# Patient Record
Sex: MALE | Race: ASIAN | Employment: UNEMPLOYED | URBAN - METROPOLITAN AREA
[De-identification: names, ages, dates, MRNs, and addresses within clinical notes are randomized per-mention and may not be internally consistent; named-entity substitution may affect disease eponyms.]

---

## 2019-03-22 ENCOUNTER — HOSPITAL ENCOUNTER (EMERGENCY)
Age: 54
Discharge: HOME | End: 2019-03-22
Attending: EMERGENCY MEDICINE
Payer: COMMERCIAL

## 2019-03-22 VITALS
OXYGEN SATURATION: 95 % | RESPIRATION RATE: 18 BRPM | HEART RATE: 67 BPM | SYSTOLIC BLOOD PRESSURE: 145 MMHG | DIASTOLIC BLOOD PRESSURE: 82 MMHG | BODY MASS INDEX: 30.54 KG/M2 | WEIGHT: 238 LBS | HEIGHT: 74 IN | TEMPERATURE: 98.1 F

## 2019-03-22 DIAGNOSIS — G70.01 MYASTHENIA EXACERBATION (HCC): ICD-10-CM

## 2019-03-22 DIAGNOSIS — T50.905A MEDICATION REACTION, INITIAL ENCOUNTER: Primary | ICD-10-CM

## 2019-03-22 PROCEDURE — 99283 EMERGENCY DEPT VISIT LOW MDM: CPT | Performed by: EMERGENCY MEDICINE

## 2019-03-22 RX ORDER — AZATHIOPRINE 50 MG/1
200 TABLET ORAL DAILY
COMMUNITY

## 2019-03-22 RX ORDER — TADALAFIL 2.5 MG/1
2.5 TABLET ORAL
COMMUNITY

## 2019-03-22 RX ORDER — PYRIDOSTIGMINE BROMIDE 180 MG/1
60 TABLET, EXTENDED RELEASE ORAL
COMMUNITY

## 2019-03-22 RX ORDER — LISINOPRIL 40 MG/1
40 TABLET ORAL DAILY
COMMUNITY

## 2019-03-22 NOTE — ED PROVIDER NOTES
[facility] Emergency Department Arrival Date/Time: [unfilled] Honey Hurley  MRN: [id number] YOB: 1965   46 y.o. male Honey Hurley is a 46 y.o. male seen on 3/22/2019 at 9:18 AM in the [facility] EMERGENCY DEPT in room ER01/01. Chief Complaint Patient presents with  Medication Reaction HPI: very pleasant 79-year-old male with history of myasthenia gravis. Had some pink eye. He was given gentamicin eyedrops Used in this morning. About 2 hours later he started having some difficulty breathing. Some weakness. Felt like he was having a myasthenic crisis. Took an extra dose of his [address] and came to the emergency room This time is feeling much better. Symptoms are resolved. No difficulty breathing no weakness. HPI Review of Systems: Review of Systems Constitutional: Negative for activity change, appetite change and chills. Respiratory: Positive for shortness of breath. Neurological: Positive for weakness. Past Medical History: Primary Care Doctor: No primary care provider on file. Past Medical History:  
Diagnosis Date  Hypertension  Ill-defined condition Myasthenia Gravis, MI  
 
Past Surgical History:  
Procedure Laterality Date  HX ORTHOPAEDIC    
 HX OTHER SURGICAL    
 thymectomy Social History Tobacco Use  Smoking status: Current Some Day Smoker  Smokeless tobacco: Former User Substance and Sexual Activity  Alcohol use: Yes Comment: occ  Drug use: Never Prior to Admission Medications Prescriptions Last Dose Informant Patient Reported? Taking? OTHER 3/22/2019 at Unknown time  Yes Yes Sig: Indications: Gentamicin 0.3% drops to left eye  
azaTHIOprine (IMURAN) 50 mg tablet 3/22/2019 at Unknown time  Yes Yes Sig: Take 200 mg by mouth daily. lisinopril (PRINIVIL, ZESTRIL) 40 mg tablet 3/22/2019 at Unknown time  Yes Yes Sig: Take 40 mg by mouth daily. pyridostigmine bromide (MESTINON SR) 180 mg SR tablet 3/22/2019 at Unknown time  Yes Yes Sig: Take 60 mg by mouth four (4) times daily as needed. tadalafil (CIALIS) 2.5 mg tablet   Yes Yes Sig: Take 2.5 mg by mouth. Facility-Administered Medications: None Not on File Physical Exam:  Nursing documentation reviewed. Vitals:  
 03/22/19 0902 BP: 159/81 Pulse: 85 Resp: 18 Temp: 98.1 °F (36.7 °C) SpO2: 95% Vital signs were reviewed. Physical Exam  
Constitutional: He is oriented to person, place, and time. He appears well-developed and well-nourished. HENT:  
Head: Normocephalic and atraumatic. Eyes: Pupils are equal, round, and reactive to light. Conjunctivae and EOM are normal.  
Cardiovascular: Normal rate and regular rhythm. Pulmonary/Chest: Breath sounds normal. No respiratory distress. Abdominal: He exhibits no distension. There is no tenderness. Neurological: He is alert and oriented to person, place, and time. Skin: Skin is warm and dry. Capillary refill takes less than 2 seconds. Nursing note and vitals reviewed. MEDICAL DECISION MAKING: 
MDM Number of Diagnoses or Management Options Medication reaction, initial encounter:  
Myasthenia exacerbation [facility]):  
Diagnosis management comments: 41-year-old male with myasthenia gravis had apparent crisis likely triggered by the gentamicin drops. We'll stop those. I looked pretty good and I don't think he needs any further treatment We'll discharge home ED Evaluation Labs:  No results found for this or any previous visit (from the past 24 hour(s)). Radiology studies performed: No orders to display No orders of the defined types were placed in this encounter. Medications - No data to display 
________________________________________________________ Procedure Documentation: Procedures 
________________________________________________________ ====================================================== 
ASSESSMENT: improved on recheck. Discharge home Dispo/Plan:    home Condition:  improved Diagnosis: 1. Medication reaction, initial encounter 2. Myasthenia exacerbation ([address] Sabi Jackson MD; 3/22/2019 @9:18 AM ================================

## 2019-03-22 NOTE — ED TRIAGE NOTES
Reports tingling to face, SOB after taking some eye drops (Gentamycin) this morning. Pt reports he has Myasthenia Gravis.